# Patient Record
Sex: MALE | Race: WHITE | URBAN - METROPOLITAN AREA
[De-identification: names, ages, dates, MRNs, and addresses within clinical notes are randomized per-mention and may not be internally consistent; named-entity substitution may affect disease eponyms.]

---

## 2017-12-03 ENCOUNTER — INPATIENT (INPATIENT)
Facility: HOSPITAL | Age: 52
LOS: 2 days | Discharge: LEFT BEFORE DISCHARGE | DRG: 917 | End: 2017-12-06
Attending: INTERNAL MEDICINE | Admitting: INTERNAL MEDICINE
Payer: COMMERCIAL

## 2017-12-03 VITALS
DIASTOLIC BLOOD PRESSURE: 80 MMHG | OXYGEN SATURATION: 100 % | SYSTOLIC BLOOD PRESSURE: 145 MMHG | HEART RATE: 111 BPM | RESPIRATION RATE: 20 BRPM | TEMPERATURE: 100 F | WEIGHT: 178.57 LBS

## 2017-12-04 LAB
ALBUMIN SERPL ELPH-MCNC: 3.7 G/DL — SIGNIFICANT CHANGE UP (ref 3.3–5)
ALP SERPL-CCNC: 48 U/L — SIGNIFICANT CHANGE UP (ref 40–120)
ALT FLD-CCNC: 32 U/L — SIGNIFICANT CHANGE UP (ref 10–45)
ANION GAP SERPL CALC-SCNC: 13 MMOL/L — SIGNIFICANT CHANGE UP (ref 5–17)
ANION GAP SERPL CALC-SCNC: 13 MMOL/L — SIGNIFICANT CHANGE UP (ref 5–17)
AST SERPL-CCNC: 29 U/L — SIGNIFICANT CHANGE UP (ref 10–40)
BASE EXCESS BLDA CALC-SCNC: -0.3 MMOL/L — SIGNIFICANT CHANGE UP (ref -2–3)
BASOPHILS NFR BLD AUTO: 0.3 % — SIGNIFICANT CHANGE UP (ref 0–2)
BILIRUB SERPL-MCNC: 0.3 MG/DL — SIGNIFICANT CHANGE UP (ref 0.2–1.2)
BUN SERPL-MCNC: 14 MG/DL — SIGNIFICANT CHANGE UP (ref 7–23)
BUN SERPL-MCNC: 15 MG/DL — SIGNIFICANT CHANGE UP (ref 7–23)
CALCIUM SERPL-MCNC: 8.7 MG/DL — SIGNIFICANT CHANGE UP (ref 8.4–10.5)
CALCIUM SERPL-MCNC: 8.7 MG/DL — SIGNIFICANT CHANGE UP (ref 8.4–10.5)
CHLORIDE SERPL-SCNC: 104 MMOL/L — SIGNIFICANT CHANGE UP (ref 96–108)
CHLORIDE SERPL-SCNC: 106 MMOL/L — SIGNIFICANT CHANGE UP (ref 96–108)
CO2 SERPL-SCNC: 22 MMOL/L — SIGNIFICANT CHANGE UP (ref 22–31)
CO2 SERPL-SCNC: 22 MMOL/L — SIGNIFICANT CHANGE UP (ref 22–31)
CREAT SERPL-MCNC: 0.9 MG/DL — SIGNIFICANT CHANGE UP (ref 0.5–1.3)
CREAT SERPL-MCNC: 0.91 MG/DL — SIGNIFICANT CHANGE UP (ref 0.5–1.3)
EOSINOPHIL NFR BLD AUTO: 0.3 % — SIGNIFICANT CHANGE UP (ref 0–6)
GAS PNL BLDA: SIGNIFICANT CHANGE UP
GLUCOSE BLDC GLUCOMTR-MCNC: 119 MG/DL — HIGH (ref 70–99)
GLUCOSE BLDC GLUCOMTR-MCNC: 141 MG/DL — HIGH (ref 70–99)
GLUCOSE SERPL-MCNC: 108 MG/DL — HIGH (ref 70–99)
GLUCOSE SERPL-MCNC: 120 MG/DL — HIGH (ref 70–99)
HCO3 BLDA-SCNC: 24 MMOL/L — SIGNIFICANT CHANGE UP (ref 21–28)
HCOV NL63 RNA SPEC QL NAA+PROBE: DETECTED
HCT VFR BLD CALC: 37.8 % — LOW (ref 39–50)
HCT VFR BLD CALC: 38.6 % — LOW (ref 39–50)
HGB BLD-MCNC: 12.8 G/DL — LOW (ref 13–17)
HGB BLD-MCNC: 13.1 G/DL — SIGNIFICANT CHANGE UP (ref 13–17)
HIV 1+2 AB+HIV1 P24 AG SERPL QL IA: SIGNIFICANT CHANGE UP
LACTATE SERPL-SCNC: 2 MMOL/L — SIGNIFICANT CHANGE UP (ref 0.5–2)
LIDOCAIN IGE QN: 15 U/L — SIGNIFICANT CHANGE UP (ref 7–60)
LYMPHOCYTES # BLD AUTO: 5.7 % — LOW (ref 13–44)
MAGNESIUM SERPL-MCNC: 1.8 MG/DL — SIGNIFICANT CHANGE UP (ref 1.6–2.6)
MAGNESIUM SERPL-MCNC: 2.7 MG/DL — HIGH (ref 1.6–2.6)
MCHC RBC-ENTMCNC: 28.4 PG — SIGNIFICANT CHANGE UP (ref 27–34)
MCHC RBC-ENTMCNC: 28.6 PG — SIGNIFICANT CHANGE UP (ref 27–34)
MCHC RBC-ENTMCNC: 33.9 G/DL — SIGNIFICANT CHANGE UP (ref 32–36)
MCHC RBC-ENTMCNC: 33.9 G/DL — SIGNIFICANT CHANGE UP (ref 32–36)
MCV RBC AUTO: 84 FL — SIGNIFICANT CHANGE UP (ref 80–100)
MCV RBC AUTO: 84.3 FL — SIGNIFICANT CHANGE UP (ref 80–100)
MONOCYTES NFR BLD AUTO: 6.2 % — SIGNIFICANT CHANGE UP (ref 2–14)
NEUTROPHILS NFR BLD AUTO: 87.5 % — HIGH (ref 43–77)
PCO2 BLDA: 37 MMHG — SIGNIFICANT CHANGE UP (ref 35–48)
PH BLDA: 7.43 — SIGNIFICANT CHANGE UP (ref 7.35–7.45)
PHOSPHATE SERPL-MCNC: 2.7 MG/DL — SIGNIFICANT CHANGE UP (ref 2.5–4.5)
PLATELET # BLD AUTO: 238 K/UL — SIGNIFICANT CHANGE UP (ref 150–400)
PLATELET # BLD AUTO: 241 K/UL — SIGNIFICANT CHANGE UP (ref 150–400)
PO2 BLDA: 192 MMHG — HIGH (ref 83–108)
POTASSIUM SERPL-MCNC: 4.1 MMOL/L — SIGNIFICANT CHANGE UP (ref 3.5–5.3)
POTASSIUM SERPL-MCNC: 4.2 MMOL/L — SIGNIFICANT CHANGE UP (ref 3.5–5.3)
POTASSIUM SERPL-SCNC: 4.1 MMOL/L — SIGNIFICANT CHANGE UP (ref 3.5–5.3)
POTASSIUM SERPL-SCNC: 4.2 MMOL/L — SIGNIFICANT CHANGE UP (ref 3.5–5.3)
PROT SERPL-MCNC: 6.8 G/DL — SIGNIFICANT CHANGE UP (ref 6–8.3)
RAPID RVP RESULT: DETECTED
RBC # BLD: 4.5 M/UL — SIGNIFICANT CHANGE UP (ref 4.2–5.8)
RBC # BLD: 4.58 M/UL — SIGNIFICANT CHANGE UP (ref 4.2–5.8)
RBC # FLD: 13.4 % — SIGNIFICANT CHANGE UP (ref 10.3–16.9)
RBC # FLD: 13.6 % — SIGNIFICANT CHANGE UP (ref 10.3–16.9)
SAO2 % BLDA: 99 % — SIGNIFICANT CHANGE UP (ref 95–100)
SODIUM SERPL-SCNC: 139 MMOL/L — SIGNIFICANT CHANGE UP (ref 135–145)
SODIUM SERPL-SCNC: 141 MMOL/L — SIGNIFICANT CHANGE UP (ref 135–145)
T PALLIDUM AB TITR SER: NEGATIVE — SIGNIFICANT CHANGE UP
VIT B12 SERPL-MCNC: 296 PG/ML — SIGNIFICANT CHANGE UP (ref 243–894)
WBC # BLD: 15.9 K/UL — HIGH (ref 3.8–10.5)
WBC # BLD: 18.8 K/UL — HIGH (ref 3.8–10.5)
WBC # FLD AUTO: 15.9 K/UL — HIGH (ref 3.8–10.5)
WBC # FLD AUTO: 18.8 K/UL — HIGH (ref 3.8–10.5)

## 2017-12-04 PROCEDURE — 71010: CPT | Mod: 26,76

## 2017-12-04 PROCEDURE — 93306 TTE W/DOPPLER COMPLETE: CPT | Mod: 26

## 2017-12-04 PROCEDURE — 99223 1ST HOSP IP/OBS HIGH 75: CPT | Mod: GC

## 2017-12-04 RX ORDER — AMPICILLIN TRIHYDRATE 250 MG
2 CAPSULE ORAL EVERY 6 HOURS
Qty: 0 | Refills: 0 | Status: DISCONTINUED | OUTPATIENT
Start: 2017-12-04 | End: 2017-12-04

## 2017-12-04 RX ORDER — VANCOMYCIN HCL 1 G
VIAL (EA) INTRAVENOUS
Qty: 0 | Refills: 0 | Status: DISCONTINUED | OUTPATIENT
Start: 2017-12-04 | End: 2017-12-04

## 2017-12-04 RX ORDER — DEXTROSE 50 % IN WATER 50 %
25 SYRINGE (ML) INTRAVENOUS ONCE
Qty: 0 | Refills: 0 | Status: DISCONTINUED | OUTPATIENT
Start: 2017-12-04 | End: 2017-12-06

## 2017-12-04 RX ORDER — PANTOPRAZOLE SODIUM 20 MG/1
40 TABLET, DELAYED RELEASE ORAL DAILY
Qty: 0 | Refills: 0 | Status: DISCONTINUED | OUTPATIENT
Start: 2017-12-04 | End: 2017-12-05

## 2017-12-04 RX ORDER — CEFTRIAXONE 500 MG/1
2 INJECTION, POWDER, FOR SOLUTION INTRAMUSCULAR; INTRAVENOUS EVERY 12 HOURS
Qty: 0 | Refills: 0 | Status: DISCONTINUED | OUTPATIENT
Start: 2017-12-04 | End: 2017-12-04

## 2017-12-04 RX ORDER — MAGNESIUM SULFATE 500 MG/ML
2 VIAL (ML) INJECTION ONCE
Qty: 0 | Refills: 0 | Status: COMPLETED | OUTPATIENT
Start: 2017-12-04 | End: 2017-12-04

## 2017-12-04 RX ORDER — THIAMINE MONONITRATE (VIT B1) 100 MG
100 TABLET ORAL DAILY
Qty: 0 | Refills: 0 | Status: DISCONTINUED | OUTPATIENT
Start: 2017-12-04 | End: 2017-12-06

## 2017-12-04 RX ORDER — PROPOFOL 10 MG/ML
5 INJECTION, EMULSION INTRAVENOUS
Qty: 1000 | Refills: 0 | Status: DISCONTINUED | OUTPATIENT
Start: 2017-12-04 | End: 2017-12-05

## 2017-12-04 RX ORDER — INSULIN LISPRO 100/ML
VIAL (ML) SUBCUTANEOUS EVERY 6 HOURS
Qty: 0 | Refills: 0 | Status: DISCONTINUED | OUTPATIENT
Start: 2017-12-04 | End: 2017-12-06

## 2017-12-04 RX ORDER — VANCOMYCIN HCL 1 G
1250 VIAL (EA) INTRAVENOUS ONCE
Qty: 0 | Refills: 0 | Status: COMPLETED | OUTPATIENT
Start: 2017-12-04 | End: 2017-12-04

## 2017-12-04 RX ORDER — THIAMINE MONONITRATE (VIT B1) 100 MG
100 TABLET ORAL ONCE
Qty: 0 | Refills: 0 | Status: COMPLETED | OUTPATIENT
Start: 2017-12-04 | End: 2017-12-04

## 2017-12-04 RX ORDER — AMPICILLIN TRIHYDRATE 250 MG
2 CAPSULE ORAL EVERY 4 HOURS
Qty: 0 | Refills: 0 | Status: DISCONTINUED | OUTPATIENT
Start: 2017-12-04 | End: 2017-12-04

## 2017-12-04 RX ORDER — LEVETIRACETAM 250 MG/1
500 TABLET, FILM COATED ORAL EVERY 12 HOURS
Qty: 0 | Refills: 0 | Status: DISCONTINUED | OUTPATIENT
Start: 2017-12-04 | End: 2017-12-05

## 2017-12-04 RX ORDER — AMPICILLIN TRIHYDRATE 250 MG
2 CAPSULE ORAL ONCE
Qty: 0 | Refills: 0 | Status: COMPLETED | OUTPATIENT
Start: 2017-12-04 | End: 2017-12-04

## 2017-12-04 RX ORDER — DEXAMETHASONE 0.5 MG/5ML
12 ELIXIR ORAL EVERY 6 HOURS
Qty: 0 | Refills: 0 | Status: DISCONTINUED | OUTPATIENT
Start: 2017-12-04 | End: 2017-12-04

## 2017-12-04 RX ORDER — DEXMEDETOMIDINE HYDROCHLORIDE IN 0.9% SODIUM CHLORIDE 4 UG/ML
0.2 INJECTION INTRAVENOUS
Qty: 200 | Refills: 0 | Status: DISCONTINUED | OUTPATIENT
Start: 2017-12-04 | End: 2017-12-04

## 2017-12-04 RX ORDER — VANCOMYCIN HCL 1 G
1000 VIAL (EA) INTRAVENOUS EVERY 8 HOURS
Qty: 0 | Refills: 0 | Status: DISCONTINUED | OUTPATIENT
Start: 2017-12-04 | End: 2017-12-04

## 2017-12-04 RX ORDER — VANCOMYCIN HCL 1 G
1250 VIAL (EA) INTRAVENOUS ONCE
Qty: 0 | Refills: 0 | Status: DISCONTINUED | OUTPATIENT
Start: 2017-12-04 | End: 2017-12-04

## 2017-12-04 RX ORDER — DEXTROSE 50 % IN WATER 50 %
1 SYRINGE (ML) INTRAVENOUS ONCE
Qty: 0 | Refills: 0 | Status: DISCONTINUED | OUTPATIENT
Start: 2017-12-04 | End: 2017-12-06

## 2017-12-04 RX ORDER — FOLIC ACID 0.8 MG
1 TABLET ORAL DAILY
Qty: 0 | Refills: 0 | Status: DISCONTINUED | OUTPATIENT
Start: 2017-12-04 | End: 2017-12-06

## 2017-12-04 RX ORDER — DEXTROSE 50 % IN WATER 50 %
12.5 SYRINGE (ML) INTRAVENOUS ONCE
Qty: 0 | Refills: 0 | Status: DISCONTINUED | OUTPATIENT
Start: 2017-12-04 | End: 2017-12-06

## 2017-12-04 RX ORDER — CHLORHEXIDINE GLUCONATE 213 G/1000ML
15 SOLUTION TOPICAL
Qty: 0 | Refills: 0 | Status: DISCONTINUED | OUTPATIENT
Start: 2017-12-04 | End: 2017-12-05

## 2017-12-04 RX ORDER — AMPICILLIN TRIHYDRATE 250 MG
CAPSULE ORAL
Qty: 0 | Refills: 0 | Status: DISCONTINUED | OUTPATIENT
Start: 2017-12-04 | End: 2017-12-04

## 2017-12-04 RX ORDER — SODIUM CHLORIDE 9 MG/ML
1000 INJECTION INTRAMUSCULAR; INTRAVENOUS; SUBCUTANEOUS
Qty: 0 | Refills: 0 | Status: DISCONTINUED | OUTPATIENT
Start: 2017-12-04 | End: 2017-12-05

## 2017-12-04 RX ORDER — MAGNESIUM SULFATE 500 MG/ML
1 VIAL (ML) INJECTION ONCE
Qty: 0 | Refills: 0 | Status: DISCONTINUED | OUTPATIENT
Start: 2017-12-04 | End: 2017-12-04

## 2017-12-04 RX ORDER — HEPARIN SODIUM 5000 [USP'U]/ML
5000 INJECTION INTRAVENOUS; SUBCUTANEOUS EVERY 8 HOURS
Qty: 0 | Refills: 0 | Status: DISCONTINUED | OUTPATIENT
Start: 2017-12-04 | End: 2017-12-06

## 2017-12-04 RX ORDER — SODIUM CHLORIDE 9 MG/ML
1000 INJECTION, SOLUTION INTRAVENOUS
Qty: 0 | Refills: 0 | Status: DISCONTINUED | OUTPATIENT
Start: 2017-12-04 | End: 2017-12-06

## 2017-12-04 RX ORDER — PROPOFOL 10 MG/ML
5 INJECTION, EMULSION INTRAVENOUS
Qty: 1000 | Refills: 0 | Status: DISCONTINUED | OUTPATIENT
Start: 2017-12-04 | End: 2017-12-04

## 2017-12-04 RX ORDER — DEXAMETHASONE 0.5 MG/5ML
10 ELIXIR ORAL ONCE
Qty: 0 | Refills: 0 | Status: DISCONTINUED | OUTPATIENT
Start: 2017-12-04 | End: 2017-12-04

## 2017-12-04 RX ORDER — GLUCAGON INJECTION, SOLUTION 0.5 MG/.1ML
1 INJECTION, SOLUTION SUBCUTANEOUS ONCE
Qty: 0 | Refills: 0 | Status: DISCONTINUED | OUTPATIENT
Start: 2017-12-04 | End: 2017-12-06

## 2017-12-04 RX ADMIN — PANTOPRAZOLE SODIUM 40 MILLIGRAM(S): 20 TABLET, DELAYED RELEASE ORAL at 13:55

## 2017-12-04 RX ADMIN — Medication 216 GRAM(S): at 09:42

## 2017-12-04 RX ADMIN — PROPOFOL 2.43 MICROGRAM(S)/KG/MIN: 10 INJECTION, EMULSION INTRAVENOUS at 09:14

## 2017-12-04 RX ADMIN — Medication 106 MILLIGRAM(S): at 01:34

## 2017-12-04 RX ADMIN — HEPARIN SODIUM 5000 UNIT(S): 5000 INJECTION INTRAVENOUS; SUBCUTANEOUS at 21:36

## 2017-12-04 RX ADMIN — Medication 166.67 MILLIGRAM(S): at 11:49

## 2017-12-04 RX ADMIN — Medication 106 MILLIGRAM(S): at 11:31

## 2017-12-04 RX ADMIN — Medication 106 MILLIGRAM(S): at 17:28

## 2017-12-04 RX ADMIN — Medication 100 MILLIGRAM(S): at 01:34

## 2017-12-04 RX ADMIN — Medication 216 GRAM(S): at 17:28

## 2017-12-04 RX ADMIN — PANTOPRAZOLE SODIUM 40 MILLIGRAM(S): 20 TABLET, DELAYED RELEASE ORAL at 03:18

## 2017-12-04 RX ADMIN — LEVETIRACETAM 400 MILLIGRAM(S): 250 TABLET, FILM COATED ORAL at 17:28

## 2017-12-04 RX ADMIN — CHLORHEXIDINE GLUCONATE 15 MILLILITER(S): 213 SOLUTION TOPICAL at 06:48

## 2017-12-04 RX ADMIN — Medication 216 GRAM(S): at 01:34

## 2017-12-04 RX ADMIN — CEFTRIAXONE 100 GRAM(S): 500 INJECTION, POWDER, FOR SOLUTION INTRAMUSCULAR; INTRAVENOUS at 09:50

## 2017-12-04 RX ADMIN — PROPOFOL 2.43 MICROGRAM(S)/KG/MIN: 10 INJECTION, EMULSION INTRAVENOUS at 13:55

## 2017-12-04 RX ADMIN — LEVETIRACETAM 400 MILLIGRAM(S): 250 TABLET, FILM COATED ORAL at 06:48

## 2017-12-04 RX ADMIN — Medication 1 MILLIGRAM(S): at 16:52

## 2017-12-04 RX ADMIN — DEXMEDETOMIDINE HYDROCHLORIDE IN 0.9% SODIUM CHLORIDE 4.05 MICROGRAM(S)/KG/HR: 4 INJECTION INTRAVENOUS at 09:41

## 2017-12-04 RX ADMIN — CHLORHEXIDINE GLUCONATE 15 MILLILITER(S): 213 SOLUTION TOPICAL at 02:41

## 2017-12-04 RX ADMIN — Medication 100 MILLIGRAM(S): at 16:52

## 2017-12-04 RX ADMIN — Medication 106 MILLIGRAM(S): at 06:48

## 2017-12-04 RX ADMIN — Medication 216 GRAM(S): at 13:47

## 2017-12-04 RX ADMIN — PROPOFOL 2.43 MICROGRAM(S)/KG/MIN: 10 INJECTION, EMULSION INTRAVENOUS at 17:42

## 2017-12-04 RX ADMIN — Medication 166.67 MILLIGRAM(S): at 02:42

## 2017-12-04 RX ADMIN — HEPARIN SODIUM 5000 UNIT(S): 5000 INJECTION INTRAVENOUS; SUBCUTANEOUS at 06:48

## 2017-12-04 RX ADMIN — HEPARIN SODIUM 5000 UNIT(S): 5000 INJECTION INTRAVENOUS; SUBCUTANEOUS at 13:47

## 2017-12-04 RX ADMIN — CHLORHEXIDINE GLUCONATE 15 MILLILITER(S): 213 SOLUTION TOPICAL at 17:28

## 2017-12-04 RX ADMIN — Medication 50 GRAM(S): at 01:34

## 2017-12-04 RX ADMIN — PROPOFOL 2.43 MICROGRAM(S)/KG/MIN: 10 INJECTION, EMULSION INTRAVENOUS at 11:31

## 2017-12-04 NOTE — H&P ADULT - NSHPLABSRESULTS_GEN_ALL_CORE
12-04    139  |  104  |  15  ----------------------------<  108<H>  4.1   |  22  |  0.91    Ca    8.7      04 Dec 2017 00:54  Phos  2.7     12-04  Mg     1.8     12-04    TPro  6.8  /  Alb  3.7  /  TBili  0.3  /  DBili  x   /  AST  29  /  ALT  32  /  AlkPhos  48  12-04                        12.8   15.9  )-----------( 241      ( 04 Dec 2017 00:54 )             37.8

## 2017-12-04 NOTE — H&P ADULT - ASSESSMENT
Patient is a 52 year old M transferred from [facility] in [place] with respiratory failure secondary and AMS of unclear etiology     NEURO:  -Sedation with propofol    #AMS  -Unclear etiology.  Possibly secondary to overdose vs seizure, r/o meningitis  -Patient initially obtunded, however now is slightly agitated.  Patient is sedated on a vent but opens eyes spontaneously and follows commands.  Moves all extremities spontaneously.  Strength 4+/5 throughout.  Eyes are midline and patient will not track, however he does not appear to have gaze deviance.  Pupils 2 mm and reactive bilaterally.  Negative babinsky bilaterally.  -Utox (+) for cocaine, marijuana, opiates, EtOH.  S/p narcan by EMS  -Head CT and CTA show no acute infarcts or ICH, however questionable transverse sinus thrombosis.  F/u neuro reccs  -Follow up LP results to rule out meningitis.  Low suspicion given history; no leukocytosis or brudzinsky/kernig, however did have rectal temp of 103 prior to transfer.  C/w rocephin, vanc, and unasyn for now.    CARDIAC:  #sinus tachycardia  -Presented to [facility] with HR 110s-120s.  -EKG:  -Trop negative x 1 at [place]    RESP:  On ventilator.  ABG at [place] showed normal pH and pCO2.  f/u repeat ABG    GI:  NPO  Protonix 40 mg daily  No bowel regimen at this time    RENAL:  Creatinine WNL at [place].   Trend BMP  Monitor UOP.  (+) Shah    HEME:  -Hgb, WBC and plt WNL at [facility].  Trend CBC  -Hep SQ      ENDO:  -No known history of thyroidal dz or DM.    -Follow fingersticks    ID:  -CXR without infiltrates.  UA without evidence of UTI.  -f/u blood cx    -f/u LP to rule out meningitis.  c/w abx for now. f/u neuro reccs.      F:  NS @   E:  Replete PRN to keep Mg>2, K>4  N:  NPO for now  Ppx: hep SQ  Code status:  full code  Dispo:  MICU Patient is a 52 year old M transferred from [facility] in [place] with respiratory failure secondary and AMS of unclear etiology     NEURO:  -Sedation with propofol    #meningitis  -Likely bacterial meningitis.  Fevers, AMS, LP with >1,000 WBC (neutrophil predominance), lactate 2.  -Preliminary LP results from [place] shows 1,000 WBC, 1,600 RBC.  Neutrophil predominance (60%).  Viral panel pending.    -f/u RVP  -c/w Rocephin, Vancomycin, Ampicillin.   -s/p Decadron 1.5 mg/kg.  C/w decadron Q6H  -f/u blood cx    #AMS  -Unclear etiology.  Likely secondary to bacterial meningitis.  Possibly secondary to overdose vs seizure  -Patient obtunded when EMS arrived.  Presented to [facility] agitated on a propofol gtt.  Opened eyes spontaneously and followed commands.  Moved all extremities spontaneously.  Strength 4+/5 throughout.  Eyes were midline and patient would not track, however he does not appear to have gaze deviance to the R.  Pupils 2 mm and reactive bilaterally.  Negative babinsky bilaterally.  -Utox (+) for cocaine, marijuana, opiates, EtOH.  S/p narcan by EMS  -Head CT and CTA show no abnormalities (per [facility] radiology attending)  -Follow up LP results.  Trx as above for meningitis   -f/u need for vEEG    ID:  -CXR without infiltrates.  UA without evidence of UTI.    #severe sepsis secondary to meningitis  -treat as above    CARDIAC:  #sinus tachycardia  -Presented to [facility] with HR 110s-120s.  -EKG:  sinus tachycardia  -Trop negative x 1 at [place]    RESP:  On ventilator.  ABG showed elevated pO2.  Titrated down FiO2.    CXR unremarkable  f/u RVP    GI:  NPO  Protonix 40 mg daily  No bowel regimen at this time    RENAL:  Creatinine WNL.   Trend BMP  Monitor UOP.  (+) Shah    HEME:  -Hgb and plt WNLTrend CBC  -WBC 15K  -Hep SQ    ENDO:  -No known history of thyroidal dz or DM.    -Follow fingersticks    F:  NS @   E:  Replete PRN to keep Mg>2, K>4  N:  NPO for now  Ppx: hep SQ  Code status:  full code  Dispo:  MICU Patient is a 52 year old M transferred from [facility] in [place] with respiratory failure and severe sepsis likely due to bacterial meningitis.    NEURO:  -Sedation with propofol    #meningitis  -Likely bacterial meningitis.  Fevers, AMS, LP with >1,000 WBC (neutrophil predominance), lactate 2.  -Preliminary LP results from [place] shows 1,000 WBC, 1,600 RBC.  Neutrophil predominance (60%).  Viral panel pending.    -f/u RVP  -c/w Rocephin, Vancomycin, Ampicillin.  Hold off on acyclovir.  -Decadron 1.5 mg/kg Q6H.    -f/u blood cx    #AMS  -Likely secondary to bacterial meningitis.  Possibly secondary to overdose vs seizure  -Patient obtunded when EMS arrived.  Presented to [facility] agitated on a propofol gtt.  Opened eyes spontaneously and followed commands.  Moved all extremities spontaneously.  Strength 4+/5 throughout.  Eyes were midline and patient would not track, however he does not appear to have gaze deviance to the R.  Pupils 2 mm and reactive bilaterally.  Negative babinsky bilaterally.  -Utox (+) for cocaine, marijuana, opiates, EtOH.  S/p narcan by EMS  -Head CT and CTA show no abnormalities (per [facility] radiology attending)  -Follow up LP results.  Trx as above for meningitis   -f/u need for vEEG  -consider serotoning syndrome in the setting of polysubstance abuse.  Obtain collateral on outpatient med list.      ID:  -CXR without infiltrates.  UA without evidence of UTI.    #severe sepsis secondary to meningitis  -treat as above.  follow up blood cx, RVP, HIV    CARDIAC:  #sinus tachycardia  -Presented to [facility] with HR 110s-120s.  -EKG:  sinus tachycardia  -Trop negative x 1 at [place]    RESP:  On ventilator.  ABG showed elevated pO2.  Titrated down FiO2.    CXR unremarkable  f/u RVP    GI:  NPO  Protonix 40 mg daily  No bowel regimen at this time    RENAL:  Creatinine WNL.   Trend BMP  Monitor UOP.  (+) Shah    HEME:  -Hgb and plt WNLTrend CBC  -WBC 15K  -Hep SQ    ENDO:  -No known history of thyroidal dz or DM.    -Follow fingersticks  -f/u TSH    F:  NS @   E:  Replete PRN to keep Mg>2, K>4  N:  NPO for now  Ppx: hep SQ  Code status:  full code  [facility] 52 [facility] from [facility] in [place] with respiratory failure and severe sepsis likely due to bacterial meningitis.    NEURO:  -Sedation with propofol    #meningitis  -Likely bacterial meningitis.  Fevers, AMS, LP with >1,000 WBC (neutrophil predominance), lactate 2.  -Preliminary LP results from [place] shows 1,000 WBC, 1,600 RBC.  Neutrophil predominance (60%).  Viral panel pending.    -f/u RVP  -c/w Rocephin, Vancomycin, Ampicillin.  Hold off on acyclovir.  -Decadron 1.5 mg/kg Q6H.    -f/u blood cx    #AMS  -Likely toxic metabolic encephalopathy secondary to bacterial meningitis.  Possibly secondary to overdose vs seizure  -Patient obtunded when EMS arrived.  Presented to [facility] agitated on a propofol gtt.  Opened eyes spontaneously and followed commands.  Moved all extremities spontaneously.  Strength 4+/5 throughout.  Eyes were midline and patient would not track, however he does not appear to have gaze deviance to the R.  Pupils 2 mm and reactive bilaterally.  Negative babinsky bilaterally.  -Utox (+) for cocaine, marijuana, opiates, EtOH.  S/p narcan by EMS  -Head CT and CTA show no abnormalities (per [facility] radiology attending)  -Follow up LP results.  Trx as above for meningitis   -f/u need for vEEG and continuation of keppra.    -consider serotoning syndrome in the setting of polysubstance abuse.  Obtain collateral on outpatient med list.    -f/u B12, thiamine, RPR, TSH    ID:  -CXR without infiltrates.  UA without evidence of UTI.    #severe sepsis secondary to meningitis  -treat as above.  follow up blood cx, RVP, HIV    CARDIAC:  #sinus tachycardia  -Resolving.  Presented to [facility] with HR 110s-120s.  Secondary to severe sepsis, possibly cocaine.  Avoid beta blockers in the setting of cocaine use.   -EKG:  sinus tachycardia  -Trop negative x 1 at [place]    RESP:  On ventilator.  ABG showed elevated pO2.  Titrated down FiO2.    CXR unremarkable  f/u RVP    GI:  NPO  Protonix 40 mg daily  No bowel regimen at this time  (+) NGT    RENAL:  Creatinine WNL.   Trend BMP  Monitor UOP.  (+) Shah    HEME:  -Hgb and plt WNLTrend CBC  -WBC 15K  -Hep SQ    ENDO:  -No known history of thyroidal dz or DM.    -Follow fingersticks  -f/u TSH    PSYCH:  #polysubstance abuse  -Utox positive for cocaine, marijuana, opiates.  Monitor for signs of withdrawal.  Once extubated, monitor CIWA     F:  NS @ 100/hour  E:  Replete PRN to keep Mg>2, K>4  N:  NPO for now  Ppx: hep SQ  Code status:  full code  Dispo:  MICU Patient is a 52 year old M transferred from [facility] in [place] with respiratory failure and severe sepsis likely due to bacterial meningitis.    NEURO:  -Sedation with propofol    #meningitis  -Likely bacterial meningitis.  Fevers, AMS, LP with >1,000 WBC (neutrophil predominance), lactate 2.  -Preliminary LP results from [place] shows 1,000 WBC, 1,600 RBC.  Neutrophil predominance (60%).  Viral panel pending.    -c/w Rocephin, Vancomycin, Ampicillin.  Hold off on acyclovir.  -Decadron 1.5 mg/kg Q6H.    -f/u blood cx, RVP    #AMS  -Likely toxic metabolic encephalopathy secondary to bacterial meningitis.  Possibly secondary to overdose vs seizure  -Patient obtunded when EMS arrived.  Presented to [facility] agitated on a propofol gtt.  Opened eyes spontaneously and followed commands.  Moved all extremities spontaneously.  Strength 4+/5 throughout.  Eyes were midline and patient would not track, however he does not appear to have gaze deviance to the R.  Pupils 2 mm and reactive bilaterally.  Negative babinsky bilaterally.  -Utox (+) for cocaine, marijuana, opiates, EtOH.  S/p narcan by EMS  -Head CT and CTA show no abnormalities (per [facility] radiology attending)  -Follow up LP results.  Trx as above for meningitis   -f/u need for vEEG and continuation of keppra.    -consider serotonin syndrome in the setting of polysubstance abuse.  Obtain collateral on outpatient med list.    -f/u B12, thiamine, RPR, TSH    ID:  -CXR without infiltrates.  UA without evidence of UTI.    #severe sepsis secondary to meningitis  -treat as above.  follow up blood cx, RVP, HIV, repeat lactate    CARDIAC:  #sinus tachycardia  -Resolving.  Presented to [facility] with HR 110s-120s.  Secondary to severe sepsis, possibly cocaine.  Avoid beta blockers in the setting of cocaine use.   -EKG:  sinus tachycardia  -Trop negative x 1 at [place]    RESP:  On ventilator.  ABG showed elevated pO2.  Titrated down FiO2.    CXR unremarkable  f/u RVP    GI:  NPO  Protonix 40 mg daily  No bowel regimen at this time  (+) NGT    RENAL:  Creatinine WNL.   Trend BMP  Monitor UOP.  (+) Shah    HEME:  -Hgb and plt WNLTrend CBC  -WBC 15K  -Hep SQ    ENDO:  -No known history of thyroidal dz or DM.    -Follow fingersticks  -f/u TSH    PSYCH:  #polysubstance abuse  -Utox positive for cocaine, marijuana, opiates.  Monitor for signs of withdrawal.  Once extubated, monitor CIWA     F:  NS @ 100/hour  E:  Replete PRN to keep Mg>2, K>4  N:  NPO for now  Ppx: hep SQ  Code status:  full code  Dispo:  MICU Patient is a 52 year old M transferred from [facility] in [place] with respiratory failure and severe sepsis likely due to bacterial meningitis.    NEURO:  -Sedation with propofol    #meningitis  -Likely bacterial meningitis.  Fevers, AMS, LP with >1,000 WBC (neutrophil predominance), lactate 2.  -Preliminary LP results from [place] shows 1,000 WBC, 1,600 RBC.  Neutrophil predominance (60%).  Viral panel pending.    -c/w Rocephin, Vancomycin, Ampicillin.  Hold off on acyclovir.  -Decadron 1.5 mg/kg Q6H.    -f/u blood cx, RVP, final LP results    #AMS  -Likely toxic metabolic encephalopathy secondary to bacterial meningitis.  Possibly secondary to overdose vs seizure vs encephalitis  -Patient obtunded when EMS arrived.  Presented to [facility] agitated on a propofol gtt.  Opened eyes spontaneously and followed commands.  Moved all extremities spontaneously.  Strength 4+/5 throughout.  Eyes were midline and patient would not track, however he does not appear to have gaze deviance to the R.  Pupils 2 mm and reactive bilaterally.  Negative babinsky bilaterally.  -Utox (+) for cocaine, marijuana, opiates, EtOH.  S/p narcan by EMS  -Head CT and CTA show no abnormalities (per [facility] radiology attending)  -Follow up LP results.  Trx as above for meningitis   -f/u need for vEEG and continuation of keppra.    -consider serotonin syndrome in the setting of polysubstance abuse.  Obtain collateral on outpatient med list.    -f/u B12, thiamine, RPR, TSH, HSV    ID:  -CXR without infiltrates.  UA without evidence of UTI.    #severe sepsis secondary to meningitis  -treat as above.  follow up blood cx, RVP, HIV, repeat lactate    CARDIAC:  #sinus tachycardia  -Resolving.  Presented to [facility] with HR 110s-120s.  Secondary to severe sepsis, possibly cocaine.  Avoid beta blockers in the setting of cocaine use.   -EKG:  sinus tachycardia  -Trop negative x 1 at [place]    RESP:  On ventilator.  ABG showed elevated pO2.  Titrated down FiO2.    CXR unremarkable  f/u RVP    GI:  NPO  Protonix 40 mg daily  No bowel regimen at this time  (+) NGT    RENAL:  Creatinine WNL.   Trend BMP  Monitor UOP.  (+) Shah    HEME:  -Hgb and plt WNLTrend CBC  -WBC 15K  -Hep SQ    ENDO:  -No known history of thyroidal dz or DM.    -Follow fingersticks  -f/u TSH    PSYCH:  #polysubstance abuse  -Utox positive for cocaine, marijuana, opiates.  Monitor for signs of withdrawal.  Once extubated, monitor CIWA     F:  NS @ 100/hour  E:  Replete PRN to keep Mg>2, K>4  N:  NPO for now  Ppx: SCDs, protonix  Code status:  full code  Dispo:  MICU Patient is a 52 year old M transferred from [facility] in [place] with AMS, respiratory failure and severe sepsis likely due to bacterial meningitis with possible polysubstance overdose and seizure.    NEURO:  -Sedation with propofol    #meningitis  -Likely bacterial meningitis.  Fevers, AMS, LP with >1,000 WBC (neutrophil predominance), lactate 2.  -Preliminary LP results from [place] shows 1,000 WBC, 1,600 RBC.  Neutrophil predominance (60%).  Viral panel pending.    -c/w Rocephin, Vancomycin, Ampicillin.  Hold off on acyclovir.  -Decadron 1.5 mg/kg Q6H.    -f/u blood cx, RVP, final LP results    #AMS  -Likely toxic metabolic encephalopathy secondary to bacterial meningitis.  Possibly secondary to overdose vs seizure vs encephalitis  -Patient obtunded when EMS arrived.  Presented to [facility] agitated on a propofol gtt.  Opened eyes spontaneously and followed commands.  Moved all extremities spontaneously.  Strength 4+/5 throughout.  Eyes were midline and patient would not track, however he does not appear to have gaze deviance to the R.  Pupils 2 mm and reactive bilaterally.  Negative babinsky bilaterally.  -Utox (+) for cocaine, marijuana, opiates, EtOH.  S/p narcan by EMS  -Head CT and CTA show no abnormalities (per [facility] radiology attending)  -Follow up LP results.  Trx as above for meningitis   -f/u need for vEEG and continuation of keppra.    -consider serotonin syndrome in the setting of polysubstance abuse.  Obtain collateral on outpatient med list.    -f/u B12, thiamine, RPR, TSH, HSV    #possible seizure  -Noted to have decorticate posturing and incontinence by EMS.  Possible sz secondary to meningitis vs polysubstance abuse  -No tongue lac.    -S/p 1 g Keppra  -c/w propofol gtt  -f/u need for vEEG and continuation of antiepileptics    ID:  -CXR without infiltrates.  UA without evidence of UTI.    #severe sepsis secondary to meningitis  -treat as above.  follow up blood cx, RVP, HIV, repeat lactate    CARDIAC:  #sinus tachycardia  -Resolving.  Presented to [facility] with HR 110s-120s.  Secondary to severe sepsis, possibly cocaine.  Avoid beta blockers in the setting of cocaine use.   -EKG:  sinus tachycardia  -Trop negative x 1 at [place]    RESP:  #respiratory failure  -Secondary to AMS.  Intubated for airway protection.  -On ventilator.  -ABG showed elevated pO2.  Titrated down FiO2.    -CXR unremarkable  -f/u RVP    GI:  NPO  Protonix 40 mg daily  No bowel regimen at this time  (+) NGT    RENAL:  Creatinine WNL.   Trend BMP  Monitor UOP.  (+) Shah    HEME:  -Hgb and plt WNLTrend CBC  -WBC 15K  -Hep SQ    ENDO:  -No known history of thyroidal dz or DM.    -Follow fingersticks  -f/u TSH    PSYCH:  #polysubstance abuse  -Utox positive for cocaine, marijuana, opiates.  Monitor for signs of withdrawal.  Once extubated, monitor CIWA     F:  NS @ 100/hour  E:  Replete PRN to keep Mg>2, K>4  N:  NPO for now  Ppx: SCDs, protonix  Code status:  full code  Dispo:  MICU Patient is a 52 year old M transferred from [facility] in [place] with AMS, respiratory failure and severe sepsis likely due to bacterial meningitis with possible polysubstance overdose and seizure.    NEURO:  -Sedation with propofol    #meningitis  -Likely bacterial meningitis.  Fevers, AMS, LP with >1,000 WBC (neutrophil predominance), lactate 2.  -Preliminary LP results from [place] shows 1,000 WBC, 1,600 RBC.  Neutrophil predominance (60%).  Viral panel pending.    -c/w Rocephin, Vancomycin, Ampicillin.  Hold off on acyclovir.  -Decadron 1.5 mg/kg Q6H.    -f/u blood cx, RVP, final LP results    #AMS  -Likely toxic metabolic encephalopathy secondary to bacterial meningitis.  Possibly secondary to overdose vs seizure vs encephalitis  -Patient obtunded when EMS arrived.  Presented to [facility] agitated on a propofol gtt.  Opened eyes spontaneously and followed commands.  Moved all extremities spontaneously.  Strength 4+/5 throughout.  Eyes were midline and patient would not track, however he does not appear to have gaze deviance to the R.  Pupils 2 mm and reactive bilaterally.  Negative babinsky bilaterally.  -Utox (+) for cocaine, marijuana, opiates, EtOH.  S/p narcan by EMS  -Head CT and CTA show no abnormalities (per [facility] radiology attending)  -Follow up LP results.  Trx as above for meningitis   -f/u need for vEEG. C/w Keppra 500 mg IV Q12H.    -consider serotonin syndrome in the setting of polysubstance abuse.  Obtain collateral on outpatient med list.    -f/u B12, thiamine, RPR, TSH, HSV    #possible seizure  -Noted to have decorticate posturing and incontinence by EMS.  Possible sz secondary to meningitis vs polysubstance abuse  -No tongue lac.    -c/w Keppra  -c/w propofol gtt  -f/u need for vEEG    ID:  -CXR with possible mild bibasilar infiltrates (air bronchograms visible, more pronounced in [place]).  UA without evidence of UTI.    #severe sepsis secondary to meningitis  -treat as above.  follow up blood cx, RVP, HIV, repeat lactate    CARDIAC:  #sinus tachycardia  -Resolving.  Presented to [facility] with HR 110s-120s.  Secondary to severe sepsis, possibly cocaine.  Avoid beta blockers in the setting of cocaine use.   -EKG:  sinus tachycardia  -Trop negative x 1 at [place]    RESP:  #respiratory failure  -Secondary to AMS.  Intubated for airway protection.  -On ventilator.  -ABG showed elevated pO2.  Titrated down FiO2.    -CXR with possible mild bibasilar infiltrates (more pronounced in [place])  -f/u RVP    GI:  NPO  Protonix 40 mg daily  No bowel regimen at this time  (+) OGT    RENAL:  Creatinine WNL.   Trend BMP  Monitor UOP.  (+) Shah    HEME:  -Hgb and plt WNL. Trend CBC  -WBC 15K  -Hep SQ    ENDO:  -No known history of thyroidal dz or DM.    -Follow fingersticks  -f/u TSH    PSYCH:  #polysubstance abuse  -Utox positive for cocaine, marijuana, opiates.  Monitor for signs of withdrawal.  Once extubated, monitor CIWA     F:  NS @ 100/hour  E:  Replete PRN to keep Mg>2, K>4  N:  NPO for now  Ppx: SCDs, protonix  Code status:  full code  Dispo:  MICU

## 2017-12-04 NOTE — H&P ADULT - NSHPPHYSICALEXAM_GEN_ALL_CORE
General:  agitated, on a ventilator  HENT:  pupils 2 mm bilaterally, reactive to light.  could not assess EOM as he would not track.  Conjunctiva clear.  MMM   Neck:  Trachea midline.  No JVD, LAD, or thyromegaly.  Heart:  S1S2 no MRG  Lungs:  CTAB no wheezing  Abdomen:  NABS.  soft, nontender, nondistended.  no guarding.  no ascites.  no organomegaly.  Vascular:  + peripheral pulses  Neuro:  agitated, not able to answer yes/no questions.  Follows commands.  Strength 4/5 throughout.  Negative babinsky.  Negative brudzinsky and kernig.    Skin:  No rash General:  agitated, on a ventilator  HENT:  pupils 2 mm bilaterally, reactive to light.  could not assess EOM as he would not track.  Conjunctiva clear.  MMM   Neck:  Trachea midline.  No JVD, LAD, or thyromegaly.  Heart:  S1S2 no MRG  Lungs:  CTAB no wheezing  Abdomen:  NABS.  soft, nontender, nondistended.  no guarding.  no ascites.  no organomegaly.  Vascular:  + peripheral pulses  Neuro:  agitated, not able to answer yes/no questions.  Follows commands.  Strength 4/5 throughout.  Negative babinsky.  Negative brudzinsky and kernig.    Skin:  No rash.  No Janeway lesions or Osler nodes.  No splinter hemorrhages.

## 2017-12-04 NOTE — H&P ADULT - NSHPSOCIALHISTORY_GEN_ALL_CORE
tobacco- unknown  drugs- per girlfriend, patient does not use drugs, however he tested positive for cocaine, opiates and marijuana.  alcohol- drinks but unclear how heavily  Lives with- unknown  occupation- unknown

## 2017-12-04 NOTE — CHART NOTE - NSCHARTNOTEFT_GEN_A_CORE
PGY-3 Chart Note:    Spoke with [facility] ED RN Zhang Gray regarding results of recent CSF studies. Per RN, due to clerical error at [facility] CSF cell count WBC was read as 1000 H/UL, however upon re-examination cell count is 1 WBC H/UL.    Given this information, bacterial meningitis diagnosis can be excluded. Antibiotics will be discontinued at this time, airborne isolation will be discontinued. Underlying etiology of his condition may be secondary to illicit drug use, vs seizure disorder. Will continue vEEG at this time.    Reny Flores  PGY-3 PGY-3 Chart Note:    Spoke with [facility] ED RN Zhang Gray regarding results of recent CSF studies. Per RN, due to clerical error at [place] CSF cell count WBC was read as 1000 H/UL, however upon re-examination cell count is 1 WBC H/UL.    Given this information, bacterial meningitis diagnosis can be excluded. Antibiotics will be discontinued at this time, airborne isolation will be discontinued. Underlying etiology of his condition may be secondary to illicit drug use, vs seizure disorder. Will continue vEEG at this time.    Confirmed re-resulted information of CSF studies is located in paper chart.    Reny Flores  PGY-3

## 2017-12-04 NOTE — H&P ADULT - HISTORY OF PRESENT ILLNESS
Pt is a 52 year old M transferred from [facility] in [place] where he was admitted for AMS.  He was found to be unresponsive on the floor of a deli shop.  History obtained from patient's girlfriend.  He had agonal respirations but strong pulses and AED advised no shock.  He was given a nonrebreather and was satting well.  EMS gave Narcan 2 mg intranasally as his pupils were noted to be pinpoint.  He was noted to have a decorticate posture and received 5 mg Versed.  In the ED, he was initially afebrile with , /110, RR 18, 98% on nonrebreather.    He was given 8 mg Ativan followed by Ketamine 50 mg x 2.  WBC 5k (61% neutrophils), hgb 14.6, plt 315k, BMP bicarb 19, AG 25, creatinine 1.1, glucose 104, trop <0.01, utox positive for cocaine, opiates, and marijuana, EtOH level 76 mg/dL.  UA without ketonuria, nitrites or LE.  Lactate 2.  CPK WNL. CT head showed acute infarct or ICH but could not exclude R transverse sinus thrombosis. Pt is a 52 year old M transferred from [facility] in [place] where he was admitted for AMS.  He was found to be unresponsive on the floor of a deli shop.  Incontinence noted.  Prior to this, he had drank a few alcoholic beverages per a friend.  No head trauma.  He had agonal respirations but strong pulses and AED advised no shock.  He was given a nonrebreather and was satting well.  EMS gave Narcan 2 mg intranasally as his pupils were noted to be pinpoint.  He was noted to have a decorticate posture and received 5 mg Versed.  In the ED, he was initially afebrile with , /110, RR 18, 98% on nonrebreather.    He was given 8 mg Ativan followed by Ketamine 50 mg x 2.  He was intubated and started on propofol gtt.  ABG pH 7.33, pCO2 41, pO2 107.  WBC 5k (61% neutrophils), hgb 14.6, plt 315k, BMP bicarb 19, AG 25, creatinine 1.1, glucose 104, trop <0.01, utox positive for cocaine, opiates, and marijuana, EtOH level 76 mg/dL.  UA without ketonuria, nitrites or LE.  Lactate 2.  CPK WNL. CXR without infiltrates.  LP was performed.  CT head showed acute infarct or ICH but could not exclude R transverse sinus thrombosis.  Prior to transfer he spiked to 103.  He received 2 grams Rocephin, Keppra 1g and was transferred to [facility].       Upon arrival to [facility], T 100, , 145/80, RR 20, O2 100%.   He was undersedated and agitated, pulling at his ET tube. Pt is a 52 year old M transferred from [facility] in [place] where he was admitted for AMS.  PMHx unknown.  He was found to be unresponsive today on the floor of a deli shop.  Incontinence noted but no tonic/clonic movements.  Prior to this, he had drank a few alcoholic beverages per a friend.  No head trauma.  He had agonal respirations but strong pulses and AED advised no shock.  He was given a nonrebreather and was satting well.  EMS gave Narcan 2 mg intranasally as his pupils were noted to be pinpoint.  Per EMS, this improved his breathing but he remained unresponsive.  He was noted to have a decorticate posture and received 5 mg Versed.  In the ED, he was initially afebrile with , /110, RR 18, 98% on nonrebreather.    He was given 8 mg Ativan (2 mg x 4) followed by Ketamine 50 mg x 2.  He was intubated and started on a propofol gtt.  ABG pH 7.33, pCO2 41, pO2 107.  WBC 5k (61% neutrophils), hgb 14.6, plt 315k, BMP bicarb 19, AG 25, creatinine 1.1, glucose 104, trop <0.01, utox positive for cocaine, opiates, and marijuana, EtOH level 76 mg/dL.  UA without ketonuria, nitrites or LE.  Lactate 2.  CPK WNL. CXR without infiltrates.  LP was performed.  CT head showed acute infarct or ICH but could not exclude R transverse sinus thrombosis.  Prior to transfer he spiked to 103.  He received 2 grams Rocephin, Keppra 1g and was transferred to [facility].       Upon arrival to [facility], T 100, , 145/80, RR 20, O2 100%.   He was undersedated and agitated, pulling at his ET tube.  Unable to answer questions but did follow some commands.

## 2017-12-05 LAB
ANION GAP SERPL CALC-SCNC: 15 MMOL/L — SIGNIFICANT CHANGE UP (ref 5–17)
BUN SERPL-MCNC: 18 MG/DL — SIGNIFICANT CHANGE UP (ref 7–23)
CALCIUM SERPL-MCNC: 9.1 MG/DL — SIGNIFICANT CHANGE UP (ref 8.4–10.5)
CHLORIDE SERPL-SCNC: 103 MMOL/L — SIGNIFICANT CHANGE UP (ref 96–108)
CO2 SERPL-SCNC: 22 MMOL/L — SIGNIFICANT CHANGE UP (ref 22–31)
CREAT SERPL-MCNC: 0.84 MG/DL — SIGNIFICANT CHANGE UP (ref 0.5–1.3)
GLUCOSE BLDC GLUCOMTR-MCNC: 120 MG/DL — HIGH (ref 70–99)
GLUCOSE BLDC GLUCOMTR-MCNC: 126 MG/DL — HIGH (ref 70–99)
GLUCOSE BLDC GLUCOMTR-MCNC: 136 MG/DL — HIGH (ref 70–99)
GLUCOSE BLDC GLUCOMTR-MCNC: 137 MG/DL — HIGH (ref 70–99)
GLUCOSE SERPL-MCNC: 139 MG/DL — HIGH (ref 70–99)
HCT VFR BLD CALC: 38.6 % — LOW (ref 39–50)
HGB BLD-MCNC: 12.7 G/DL — LOW (ref 13–17)
MAGNESIUM SERPL-MCNC: 2.3 MG/DL — SIGNIFICANT CHANGE UP (ref 1.6–2.6)
MCHC RBC-ENTMCNC: 28.2 PG — SIGNIFICANT CHANGE UP (ref 27–34)
MCHC RBC-ENTMCNC: 32.9 G/DL — SIGNIFICANT CHANGE UP (ref 32–36)
MCV RBC AUTO: 85.6 FL — SIGNIFICANT CHANGE UP (ref 80–100)
PLATELET # BLD AUTO: 242 K/UL — SIGNIFICANT CHANGE UP (ref 150–400)
POTASSIUM SERPL-MCNC: 4.5 MMOL/L — SIGNIFICANT CHANGE UP (ref 3.5–5.3)
POTASSIUM SERPL-SCNC: 4.5 MMOL/L — SIGNIFICANT CHANGE UP (ref 3.5–5.3)
RBC # BLD: 4.51 M/UL — SIGNIFICANT CHANGE UP (ref 4.2–5.8)
RBC # FLD: 13.5 % — SIGNIFICANT CHANGE UP (ref 10.3–16.9)
SODIUM SERPL-SCNC: 140 MMOL/L — SIGNIFICANT CHANGE UP (ref 135–145)
WBC # BLD: 17.3 K/UL — HIGH (ref 3.8–10.5)
WBC # FLD AUTO: 17.3 K/UL — HIGH (ref 3.8–10.5)

## 2017-12-05 PROCEDURE — 99233 SBSQ HOSP IP/OBS HIGH 50: CPT | Mod: GC

## 2017-12-05 PROCEDURE — 71010: CPT | Mod: 26

## 2017-12-05 RX ORDER — FENTANYL CITRATE 50 UG/ML
1 INJECTION INTRAVENOUS
Qty: 2500 | Refills: 0 | Status: DISCONTINUED | OUTPATIENT
Start: 2017-12-05 | End: 2017-12-05

## 2017-12-05 RX ORDER — QUETIAPINE FUMARATE 200 MG/1
50 TABLET, FILM COATED ORAL EVERY 12 HOURS
Qty: 0 | Refills: 0 | Status: DISCONTINUED | OUTPATIENT
Start: 2017-12-05 | End: 2017-12-05

## 2017-12-05 RX ORDER — DEXMEDETOMIDINE HYDROCHLORIDE IN 0.9% SODIUM CHLORIDE 4 UG/ML
0.7 INJECTION INTRAVENOUS
Qty: 200 | Refills: 0 | Status: DISCONTINUED | OUTPATIENT
Start: 2017-12-05 | End: 2017-12-05

## 2017-12-05 RX ORDER — DIAZEPAM 5 MG
5 TABLET ORAL EVERY 6 HOURS
Qty: 0 | Refills: 0 | Status: DISCONTINUED | OUTPATIENT
Start: 2017-12-05 | End: 2017-12-06

## 2017-12-05 RX ADMIN — CHLORHEXIDINE GLUCONATE 15 MILLILITER(S): 213 SOLUTION TOPICAL at 06:54

## 2017-12-05 RX ADMIN — DEXMEDETOMIDINE HYDROCHLORIDE IN 0.9% SODIUM CHLORIDE 14.18 MICROGRAM(S)/KG/HR: 4 INJECTION INTRAVENOUS at 13:48

## 2017-12-05 RX ADMIN — Medication 100 MILLIGRAM(S): at 13:47

## 2017-12-05 RX ADMIN — QUETIAPINE FUMARATE 50 MILLIGRAM(S): 200 TABLET, FILM COATED ORAL at 13:47

## 2017-12-05 RX ADMIN — FENTANYL CITRATE 8.1 MICROGRAM(S)/KG/HR: 50 INJECTION INTRAVENOUS at 00:24

## 2017-12-05 RX ADMIN — Medication 5 MILLIGRAM(S): at 23:08

## 2017-12-05 RX ADMIN — SODIUM CHLORIDE 100 MILLILITER(S): 9 INJECTION INTRAMUSCULAR; INTRAVENOUS; SUBCUTANEOUS at 13:48

## 2017-12-05 RX ADMIN — LEVETIRACETAM 400 MILLIGRAM(S): 250 TABLET, FILM COATED ORAL at 06:54

## 2017-12-05 RX ADMIN — PANTOPRAZOLE SODIUM 40 MILLIGRAM(S): 20 TABLET, DELAYED RELEASE ORAL at 13:46

## 2017-12-05 RX ADMIN — Medication 1 MILLIGRAM(S): at 13:47

## 2017-12-05 RX ADMIN — HEPARIN SODIUM 5000 UNIT(S): 5000 INJECTION INTRAVENOUS; SUBCUTANEOUS at 06:55

## 2017-12-05 RX ADMIN — HEPARIN SODIUM 5000 UNIT(S): 5000 INJECTION INTRAVENOUS; SUBCUTANEOUS at 16:22

## 2017-12-05 RX ADMIN — HEPARIN SODIUM 5000 UNIT(S): 5000 INJECTION INTRAVENOUS; SUBCUTANEOUS at 22:00

## 2017-12-05 NOTE — PROGRESS NOTE ADULT - SUBJECTIVE AND OBJECTIVE BOX
Hospital Course  Pt is a 52 year old man transferred from [facility] in [place] was admitted for AMS found unresponsive on floor of a deli shop.  Hx significant of illicit drug use as per family and alcohol use as per friend. EMS was called, patient had agonal respirations observed by EMT.  Patient was put on a nonrebreather.  EMS gave Narcan 2mg intranasally because of pinpoint pupils.  Patient remained unresponsive.  Noted to have a decorticate posture received 5mg Versed.  In the ED patient afebrile, tachycardic, with elevated blood pressure 164/110 satting well on nonrebreather.  Patient was given 8mg of Ativan followed by Ketamine.  He was intubated and started on propofol drip.  Blood gas was pH 7.33, pCO2 41, pO2 107.  Bmp bicarb 19, anion gap 25.  Utox positive for cocaine, opiates and marijuana, ETOH level 76mg/dl.  Lactate 2. CPK WNL. LP was performed read initially as 1000 WBC, CT head negative.  Prior to transfer he spiked to 103.  Patient received 2 grams of Rocephin, Keppra 1g and was transferred for EEG monitoring and for MRI [place] with no vented patient MRI capabilities). RVP came back positive for coronavirus.  Patient on arrival to [facility] was unable to extubated 2/2 agitation.  Placed back on propofol drip.  Patient started on thiamine and folic acid.  [facility] was called new read WBC actually 1 not 1000 gram stain negative.  Antibiotics and contact was d'cd.  Echo showed mild global hypokinesis EF 40 to 45 percent.  Patient extubated today 12/5 after weaning of propofol drip started precedex and seroquel 50mg BID and valium 10mg q 6h to assist with weaning.  Straight cathed x 2 for urinary retention.  Valium titrated down to 5  q 6 h and seroquel d'cd once extubated.    INTERVAL HPI/OVERNIGHT EVENTS:  Patient was seen and examined at bedside no acute events. Patient sedated cannot assess complaints.    VITAL SIGNS:  ICU Vital Signs Last 24 Hrs  T(C): 36.3 (05 Dec 2017 17:53), Max: 37.1 (05 Dec 2017 01:32)  T(F): 97.4 (05 Dec 2017 17:53), Max: 98.8 (05 Dec 2017 01:32)  HR: 58 (05 Dec 2017 18:00) (54 - 102)  BP: 117/75 (05 Dec 2017 18:00) (84/49 - 138/67)  BP(mean): 98 (05 Dec 2017 18:00) (63 - 118)  ABP: --  ABP(mean): --  RR: 12 (05 Dec 2017 18:00) (11 - 23)  SpO2: 96% (05 Dec 2017 18:00) (94% - 100%)      PHYSICAL EXAM:    Constitutional: WDWN, NAD  HEENT: PERRL, EOMI, sclera non-icteric, neck supple, trachea midline, no masses, no JVD, MMM, good dentition  Respiratory: CTA b/l, good air entry b/l, no wheezing, no rhonchi, no rales, without accessory muscle use and no intercostal retractions  Cardiovascular: RRR, normal S1S2, no M/R/G  Gastrointestinal: soft, NTND, no masses palpable, BS normal  Extremities: Warm, well perfused, pulses equal bilateral upper and lower extremities, no edema, no clubbing  Neurological: AAOx3, CN Grossly intact  Skin: Normal temperature, warm, dry    MEDICATIONS  (STANDING):  dextrose 5%. 1000 milliLiter(s) (50 mL/Hr) IV Continuous <Continuous>  dextrose 50% Injectable 12.5 Gram(s) IV Push once  dextrose 50% Injectable 25 Gram(s) IV Push once  dextrose 50% Injectable 25 Gram(s) IV Push once  diazepam    Tablet 5 milliGRAM(s) Oral every 6 hours  folic acid 1 milliGRAM(s) Enteral Tube daily  heparin  Injectable 5000 Unit(s) SubCutaneous every 8 hours  insulin lispro (HumaLOG) corrective regimen sliding scale   SubCutaneous every 6 hours  thiamine 100 milliGRAM(s) Enteral Tube daily    MEDICATIONS  (PRN):  dextrose Gel 1 Dose(s) Oral once PRN Blood Glucose LESS THAN 70 milliGRAM(s)/deciliter  glucagon  Injectable 1 milliGRAM(s) IntraMuscular once PRN Glucose LESS THAN 70 milligrams/deciliter      Allergies    No Known Allergies    Intolerances        LABS:                        12.7   17.3  )-----------( 242      ( 05 Dec 2017 06:55 )             38.6     12-05    140  |  103  |  18  ----------------------------<  139<H>  4.5   |  22  |  0.84    Ca    9.1      05 Dec 2017 06:55  Phos  2.7     12-04  Mg     2.3     12-05    TPro  6.8  /  Alb  3.7  /  TBili  0.3  /  DBili  x   /  AST  29  /  ALT  32  /  AlkPhos  48  12-04          RADIOLOGY & ADDITIONAL TESTS:  Reviewed Hospital Course  Pt is a 52 year old man transferred from [facility] in [place] was admitted for AMS found unresponsive on floor of a deli shop.  Hx significant of illicit drug use as per family and alcohol use as per friend. EMS was called, patient had agonal respirations observed by EMT.  Patient was put on a nonrebreather.  EMS gave Narcan 2mg intranasally because of pinpoint pupils.  Patient remained unresponsive.  Noted to have a decorticate posture received 5mg Versed.  In the ED patient afebrile, tachycardic, with elevated blood pressure 164/110 satting well on nonrebreather.  Patient was given 8mg of Ativan followed by Ketamine.  He was intubated and started on propofol drip.  Blood gas was pH 7.33, pCO2 41, pO2 107.  Bmp bicarb 19, anion gap 25.  Utox positive for cocaine, opiates and marijuana, ETOH level 76mg/dl.  Lactate 2. CPK WNL. LP was performed read initially as 1000 WBC, CT head negative.  Prior to transfer he spiked to 103.  Patient received 2 grams of Rocephin, Keppra 1g and was transferred for EEG monitoring and for MRI [place] with no vented patient MRI capabilities). RVP came back positive for coronavirus.  Patient on arrival to [facility] was unable to extubated 2/2 agitation.  Placed back on propofol drip.  Patient started on thiamine and folic acid.  [facility] was called new read WBC actually 1 not 1000 gram stain negative.  Antibiotics and contact was d'cd.  Echo showed mild global hypokinesis EF 40 to 45 percent.  Patient extubated today 12/5 after weaning of propofol drip started precedex and seroquel 50mg BID and valium 10mg q 6h to assist with weaning.  Straight cathed x 2 for urinary retention.  Valium titrated down to 5  q 6 h and seroquel d'cd once extubated.    INTERVAL HPI/OVERNIGHT EVENTS:  Patient was seen and examined at bedside no acute events. Patient sedated cannot assess complaints.    VITAL SIGNS:  ICU Vital Signs Last 24 Hrs  T(C): 36.3 (05 Dec 2017 17:53), Max: 37.1 (05 Dec 2017 01:32)  T(F): 97.4 (05 Dec 2017 17:53), Max: 98.8 (05 Dec 2017 01:32)  HR: 58 (05 Dec 2017 18:00) (54 - 102)  BP: 117/75 (05 Dec 2017 18:00) (84/49 - 138/67)  BP(mean): 98 (05 Dec 2017 18:00) (63 - 118)  ABP: --  ABP(mean): --  RR: 12 (05 Dec 2017 18:00) (11 - 23)  SpO2: 96% (05 Dec 2017 18:00) (94% - 100%)      PHYSICAL EXAM:  AM EXAM  Constitutional: This AM sedated, NAD.  HEENT: Pupils 1mm but reactive to light, neck supple, no JVD, MMM, intubated.  Respiratory: CTA b/l, good air entry b/l, no wheezing, no rhonchi, no rales, without accessory muscle use and no intercostal retractions  Cardiovascular: RRR, normal S1S2, no M/R/G  Gastrointestinal: soft, NTND, no masses palpable, BS normal  Extremities: Warm, well perfused, pulses equal bilateral upper and lower extremities, no edema, no clubbing  Neurological: sedated.    Skin: Normal temperature, warm, dry    MEDICATIONS  (STANDING):  dextrose 5%. 1000 milliLiter(s) (50 mL/Hr) IV Continuous <Continuous>  dextrose 50% Injectable 12.5 Gram(s) IV Push once  dextrose 50% Injectable 25 Gram(s) IV Push once  dextrose 50% Injectable 25 Gram(s) IV Push once  diazepam    Tablet 5 milliGRAM(s) Oral every 6 hours  folic acid 1 milliGRAM(s) Enteral Tube daily  heparin  Injectable 5000 Unit(s) SubCutaneous every 8 hours  insulin lispro (HumaLOG) corrective regimen sliding scale   SubCutaneous every 6 hours  thiamine 100 milliGRAM(s) Enteral Tube daily    MEDICATIONS  (PRN):  dextrose Gel 1 Dose(s) Oral once PRN Blood Glucose LESS THAN 70 milliGRAM(s)/deciliter  glucagon  Injectable 1 milliGRAM(s) IntraMuscular once PRN Glucose LESS THAN 70 milligrams/deciliter        Allergies    No Known Allergies    Intolerances        LABS:                        12.7   17.3  )-----------( 242      ( 05 Dec 2017 06:55 )             38.6     12-05    140  |  103  |  18  ----------------------------<  139<H>  4.5   |  22  |  0.84    Ca    9.1      05 Dec 2017 06:55  Phos  2.7     12-04  Mg     2.3     12-05    TPro  6.8  /  Alb  3.7  /  TBili  0.3  /  DBili  x   /  AST  29  /  ALT  32  /  AlkPhos  48  12-04          RADIOLOGY & ADDITIONAL TESTS:  Reviewed

## 2017-12-05 NOTE — PROGRESS NOTE ADULT - ASSESSMENT
Patient is a 52 year old M transferred from [facility] in [place] with AMS, respiratory failure and severe sepsis likely due to bacterial meningitis with possible polysubstance overdose and seizure.    NEURO:  -Sedation with propofol    #meningitis  -Likely bacterial meningitis.  Fevers, AMS, LP with >1,000 WBC (neutrophil predominance), lactate 2.  -Preliminary LP results from [place] shows 1,000 WBC, 1,600 RBC.  Neutrophil predominance (60%).  Viral panel pending.    -c/w Rocephin, Vancomycin, Ampicillin.  Hold off on acyclovir.  -Decadron 1.5 mg/kg Q6H.    -f/u blood cx, RVP, final LP results    #AMS  -Likely toxic metabolic encephalopathy secondary to bacterial meningitis.  Possibly secondary to overdose vs seizure vs encephalitis  -Patient obtunded when EMS arrived.  Presented to [facility] agitated on a propofol gtt.  Opened eyes spontaneously and followed commands.  Moved all extremities spontaneously.  Strength 4+/5 throughout.  Eyes were midline and patient would not track, however he does not appear to have gaze deviance to the R.  Pupils 2 mm and reactive bilaterally.  Negative babinsky bilaterally.  -Utox (+) for cocaine, marijuana, opiates, EtOH.  S/p narcan by EMS  -Head CT and CTA show no abnormalities (per [facility] radiology attending)  -Follow up LP results.  Trx as above for meningitis   -f/u need for vEEG. C/w Keppra 500 mg IV Q12H.    -consider serotonin syndrome in the setting of polysubstance abuse.  Obtain collateral on outpatient med list.    -f/u B12, thiamine, RPR, TSH, HSV    #possible seizure  -Noted to have decorticate posturing and incontinence by EMS.  Possible sz secondary to meningitis vs polysubstance abuse  -No tongue lac.    -c/w Keppra  -c/w propofol gtt  -f/u need for vEEG    ID:  -CXR with possible mild bibasilar infiltrates (air bronchograms visible, more pronounced in LLL).  UA without evidence of UTI.    #severe sepsis secondary to meningitis  -treat as above.  follow up blood cx, RVP, HIV, repeat lactate    CARDIAC:  #sinus tachycardia  -Resolving.  Presented to [facility] with HR 110s-120s.  Secondary to severe sepsis, possibly cocaine.  Avoid beta blockers in the setting of cocaine use.   -EKG:  sinus tachycardia  -Trop negative x 1 at [place]    RESP:  #respiratory failure  -Secondary to AMS.  Intubated for airway protection.  -On ventilator.  -ABG showed elevated pO2.  Titrated down FiO2.    -CXR with possible mild bibasilar infiltrates (more pronounced in LLL)  -f/u RVP    GI:  NPO  Protonix 40 mg daily  No bowel regimen at this time  (+) OGT    RENAL:  Creatinine WNL.   Trend BMP  Monitor UOP.  (+) Shah    HEME:  -Hgb and plt WNL. Trend CBC  -WBC 15K  -Hep SQ    ENDO:  -No known history of thyroidal dz or DM.    -Follow fingersticks  -f/u TSH    PSYCH:  #polysubstance abuse  -Utox positive for cocaine, marijuana, opiates.  Monitor for signs of withdrawal.  Once extubated, monitor CIWA     F:  NS @ 100/hour  E:  Replete PRN to keep Mg>2, K>4  N:  NPO for now  Ppx: SCDs, protonix  Code status:  full code  Dispo:  MICU Patient is a 52 year old M transferred from [facility] in [place] with AMS, respiratory failure 2/2 to polysubstance use.   NEURO:  - Sedated this AM with propofol, wean off propofol drip with Seroquel and  precedex and valium.  - Patient extubated this PM on rounds.  Valium titrated down to 5 q 6 h and seroquel d'cd once extubated.     #AMS  -Likely secondary to polysubstance use overdose  -Utox (+) for cocaine, marijuana, opiates, EtOH.  S/p narcan by EMS  -Head CT and CTA show no abnormalities (per [facility] radiology attending)  - D'cd keppra today  - LP negative r/o meningitis  - c/w valium now extubated     ID:  - no infection    CARDIAC:  #sinus tachycardia  -Resolved, possibly 2/2 to cocaine use  -EKG:  sinus tachycardia  -Trop negative x 1 at [place]    RESP:  #respiratory failure  -Secondary to AMS.  Intubated for airway protection.  -On ventilator this AM extubated this PM with no complciations    GI:  NPO until passed speech and swallow evaluation  No bowel regimen at this time    RENAL:  Creatinine WNL.   Trend BMP  Monitor UOP s/p straight cath x 2     HEME:  -Hgb and plt WNL. Trend CBC  -WBC 17.3K  -Hep SQ    ENDO:  -No known history of thyroidal dz or DM.        PSYCH:  #polysubstance abuse  -Utox positive for cocaine, marijuana, opiates.  Monitor for signs of withdrawal.  Once extubated, monitor CIWA     F: D5 50 cc/hr  E:  Replete PRN to keep Mg>2, K>4  N:  NPO for now  Ppx: hep sub q   Code status:  full code  Dispo:  MICU Patient is a 52 year old M transferred from [facility] in [place] with AMS, respiratory failure 2/2 to polysubstance use and toxic metabolic encephalopathy.   NEURO:  - Sedated this AM with propofol, wean off propofol drip with Seroquel and  precedex and valium.  - Patient extubated this PM on rounds.  Valium titrated down to 5 q 6 h and seroquel d'cd once extubated.     #AMS  -Likely secondary to polysubstance use overdose  -Utox (+) for cocaine, marijuana, opiates, EtOH.  S/p narcan by EMS  -Head CT and CTA show no abnormalities (per [facility] radiology attending)  - D'cd keppra today  - LP negative r/o meningitis  - c/w valium now extubated     ID:  - no infection    CARDIAC:  #sinus tachycardia  -Resolved, possibly 2/2 to cocaine use  -EKG:  sinus tachycardia  -Trop negative x 1 at [place]    RESP:  #respiratory failure  -Secondary to AMS.  Intubated for airway protection.  -On ventilator this AM extubated this PM with no complciations    GI:  NPO until passed speech and swallow evaluation  No bowel regimen at this time    RENAL:  Creatinine WNL.   Trend BMP  Monitor UOP s/p straight cath x 2     HEME:  -Hgb and plt WNL. Trend CBC  -WBC 17.3K  -Hep SQ    ENDO:  -No known history of thyroidal dz or DM.        PSYCH:  #polysubstance abuse  -Utox positive for cocaine, marijuana, opiates.  Monitor for signs of withdrawal.  Once extubated, monitor CIWA     F: D5 50 cc/hr  E:  Replete PRN to keep Mg>2, K>4  N:  NPO for now  Ppx: hep sub q   Code status:  full code  Dispo:  MICU

## 2017-12-05 NOTE — DIETITIAN INITIAL EVALUATION ADULT. - NS AS NUTRI INTERV ENTERAL NUTRITION
Jevity 1.2 at 50 mL x 24 hr route per MD to provide 1200 mL TV, 1440 kcal, 67 g protein, & 968 mL fluid

## 2017-12-05 NOTE — DIETITIAN INITIAL EVALUATION ADULT. - OTHER INFO
Patient is a 52 year old M transferred from [facility] in [place] with AMS, respiratory failure and severe sepsis likely due to bacterial meningitis with possible polysubstance overdose and seizure. Intubated at time of visit, per RN pt for possible extubation. Ht obtained with RN - 5'4''. MAP 93.

## 2017-12-06 VITALS — DIASTOLIC BLOOD PRESSURE: 77 MMHG | RESPIRATION RATE: 14 BRPM | SYSTOLIC BLOOD PRESSURE: 127 MMHG | HEART RATE: 66 BPM

## 2017-12-06 LAB
ANION GAP SERPL CALC-SCNC: 12 MMOL/L — SIGNIFICANT CHANGE UP (ref 5–17)
BUN SERPL-MCNC: 23 MG/DL — SIGNIFICANT CHANGE UP (ref 7–23)
CALCIUM SERPL-MCNC: 8.9 MG/DL — SIGNIFICANT CHANGE UP (ref 8.4–10.5)
CHLORIDE SERPL-SCNC: 106 MMOL/L — SIGNIFICANT CHANGE UP (ref 96–108)
CO2 SERPL-SCNC: 24 MMOL/L — SIGNIFICANT CHANGE UP (ref 22–31)
CREAT SERPL-MCNC: 0.86 MG/DL — SIGNIFICANT CHANGE UP (ref 0.5–1.3)
GLUCOSE BLDC GLUCOMTR-MCNC: 98 MG/DL — SIGNIFICANT CHANGE UP (ref 70–99)
GLUCOSE SERPL-MCNC: 99 MG/DL — SIGNIFICANT CHANGE UP (ref 70–99)
HCT VFR BLD CALC: 36.1 % — LOW (ref 39–50)
HGB BLD-MCNC: 11.7 G/DL — LOW (ref 13–17)
MAGNESIUM SERPL-MCNC: 2.2 MG/DL — SIGNIFICANT CHANGE UP (ref 1.6–2.6)
MCHC RBC-ENTMCNC: 28.1 PG — SIGNIFICANT CHANGE UP (ref 27–34)
MCHC RBC-ENTMCNC: 32.4 G/DL — SIGNIFICANT CHANGE UP (ref 32–36)
MCV RBC AUTO: 86.6 FL — SIGNIFICANT CHANGE UP (ref 80–100)
PLATELET # BLD AUTO: 193 K/UL — SIGNIFICANT CHANGE UP (ref 150–400)
POTASSIUM SERPL-MCNC: 3.9 MMOL/L — SIGNIFICANT CHANGE UP (ref 3.5–5.3)
POTASSIUM SERPL-SCNC: 3.9 MMOL/L — SIGNIFICANT CHANGE UP (ref 3.5–5.3)
RBC # BLD: 4.17 M/UL — LOW (ref 4.2–5.8)
RBC # FLD: 13.6 % — SIGNIFICANT CHANGE UP (ref 10.3–16.9)
SODIUM SERPL-SCNC: 142 MMOL/L — SIGNIFICANT CHANGE UP (ref 135–145)
WBC # BLD: 12.2 K/UL — HIGH (ref 3.8–10.5)
WBC # FLD AUTO: 12.2 K/UL — HIGH (ref 3.8–10.5)

## 2017-12-06 PROCEDURE — 87040 BLOOD CULTURE FOR BACTERIA: CPT

## 2017-12-06 PROCEDURE — 87486 CHLMYD PNEUM DNA AMP PROBE: CPT

## 2017-12-06 PROCEDURE — 82607 VITAMIN B-12: CPT

## 2017-12-06 PROCEDURE — 71045 X-RAY EXAM CHEST 1 VIEW: CPT

## 2017-12-06 PROCEDURE — 85025 COMPLETE CBC W/AUTO DIFF WBC: CPT

## 2017-12-06 PROCEDURE — 80053 COMPREHEN METABOLIC PANEL: CPT

## 2017-12-06 PROCEDURE — 84425 ASSAY OF VITAMIN B-1: CPT

## 2017-12-06 PROCEDURE — 84100 ASSAY OF PHOSPHORUS: CPT

## 2017-12-06 PROCEDURE — 83690 ASSAY OF LIPASE: CPT

## 2017-12-06 PROCEDURE — 82803 BLOOD GASES ANY COMBINATION: CPT

## 2017-12-06 PROCEDURE — 85027 COMPLETE CBC AUTOMATED: CPT

## 2017-12-06 PROCEDURE — 87633 RESP VIRUS 12-25 TARGETS: CPT

## 2017-12-06 PROCEDURE — 87581 M.PNEUMON DNA AMP PROBE: CPT

## 2017-12-06 PROCEDURE — 82962 GLUCOSE BLOOD TEST: CPT

## 2017-12-06 PROCEDURE — 87389 HIV-1 AG W/HIV-1&-2 AB AG IA: CPT

## 2017-12-06 PROCEDURE — 95951: CPT

## 2017-12-06 PROCEDURE — 86780 TREPONEMA PALLIDUM: CPT

## 2017-12-06 PROCEDURE — 93306 TTE W/DOPPLER COMPLETE: CPT

## 2017-12-06 PROCEDURE — 80048 BASIC METABOLIC PNL TOTAL CA: CPT

## 2017-12-06 PROCEDURE — 94002 VENT MGMT INPAT INIT DAY: CPT

## 2017-12-06 PROCEDURE — 83605 ASSAY OF LACTIC ACID: CPT

## 2017-12-06 PROCEDURE — 87798 DETECT AGENT NOS DNA AMP: CPT

## 2017-12-06 PROCEDURE — 94003 VENT MGMT INPAT SUBQ DAY: CPT

## 2017-12-06 PROCEDURE — 83735 ASSAY OF MAGNESIUM: CPT

## 2017-12-06 RX ADMIN — HEPARIN SODIUM 5000 UNIT(S): 5000 INJECTION INTRAVENOUS; SUBCUTANEOUS at 06:00

## 2017-12-06 RX ADMIN — Medication 5 MILLIGRAM(S): at 05:57

## 2017-12-06 NOTE — DISCHARGE NOTE ADULT - PATIENT PORTAL LINK FT
“You can access the Follow[facility] Patient Portal, offered by [facility], by registering with the following website: [email or web address]”

## 2017-12-06 NOTE — DISCHARGE NOTE ADULT - HOSPITAL COURSE
Pt is a 52 year old man transferred from [facility] in [place] was admitted for AMS found unresponsive on floor of a deli shop.  Hx significant of illicit drug use as per family and alcohol use as per friend. EMS was called, patient had agonal respirations observed by EMT.  Patient was put on a nonrebreather.  EMS gave Narcan 2mg intranasally because of pinpoint pupils.  Patient remained unresponsive.  Noted to have a decorticate posture received 5mg Versed.  In the ED patient afebrile, tachycardic, with elevated blood pressure 164/110 satting well on nonrebreather.  Patient was given 8mg of Ativan followed by Ketamine.  He was intubated and started on propofol drip.  Blood gas was pH 7.33, pCO2 41, pO2 107.  Bmp bicarb 19, anion gap 25.  Utox positive for cocaine, opiates and marijuana, ETOH level 76mg/dl.  Lactate 2. CPK WNL. LP was performed read initially as 1000 WBC, CT head negative.  Prior to transfer he spiked to 103.  Patient received 2 grams of Rocephin, Keppra 1g and was transferred for EEG monitoring and for MRI [place] with no vented patient MRI capabilities). RVP came back positive for coronavirus.  Patient on arrival to [facility] was unable to extubated 2/2 agitation.  Placed back on propofol drip.  Patient started on thiamine and folic acid.  [facility] was called new read WBC actually 1 not 1000 gram stain negative.  Antibiotics and contact was d'cd.  Echo showed mild global hypokinesis EF 40 to 45 percent.  Patient extubated today 12/5 after weaning of propofol drip started precedex and seroquel 50mg BID and valium 10mg q 6h to assist with weaning.  Straight cathed x 2 for urinary retention.  Valium titrated down to 5  q 6 h and seroquel d'cd once extubated. Pt is a 52 year old man transferred from [facility] in [place] was admitted for AMS found unresponsive on floor of a deli shop.  Hx significant of illicit drug use as per family and alcohol use as per friend. EMS was called, patient had agonal respirations observed by EMT.  Patient was put on a nonrebreather.  EMS gave Narcan 2mg intranasally because of pinpoint pupils.  Patient remained unresponsive.  Noted to have a decorticate posture received 5mg Versed.  In the ED patient afebrile, tachycardic, with elevated blood pressure 164/110 satting well on nonrebreather.  Patient was given 8mg of Ativan followed by Ketamine.  He was intubated and started on propofol drip.  Blood gas was pH 7.33, pCO2 41, pO2 107.  Bmp bicarb 19, anion gap 25.  Utox positive for cocaine, opiates and marijuana, ETOH level 76mg/dl.  Lactate 2. CPK WNL. LP was performed read initially as 1000 WBC, CT head negative.  Prior to transfer he spiked to 103.  Patient received 2 grams of Rocephin, Keppra 1g and was transferred for EEG monitoring and for MRI [place] with no vented patient MRI capabilities). RVP came back positive for coronavirus.  Patient on arrival to [facility] was unable to extubated 2/2 agitation.  Placed back on propofol drip.  Patient started on thiamine and folic acid.  [facility] was called new read WBC actually 1 not 1000 gram stain negative.  Antibiotics and contact was d'cd.  Echo showed mild global hypokinesis EF 40 to 45 percent.  Patient extubated today 12/5 after weaning of propofol drip started precedex and seroquel 50mg BID and valium 10mg q 6h to assist with weaning.  Straight cathed x 2 for urinary retention.  Valium titrated down to 5  q 6 h and seroquel d'cd once extubated. Patient started on regular diet.

## 2017-12-06 NOTE — DISCHARGE NOTE ADULT - PLAN OF CARE
Resolution of altered mental status You came in altered 2/2 to drug use.  You were treated for your respiratory distress and was intubated during this admission.  Follow up with your PCP for long term management of drug misuse. return to baseline respiratory status You were treated for respiratory distress 2/2 to drug use.  You were intubated and successfully extubated.

## 2017-12-06 NOTE — DISCHARGE NOTE ADULT - CARE PLAN
Principal Discharge DX:	Polysubstance abuse  Goal:	Resolution of altered mental status  Instructions for follow-up, activity and diet:	You came in altered 2/2 to drug use.  You were treated for your respiratory distress and was intubated during this admission.  Follow up with your PCP for long term management of drug misuse.  Secondary Diagnosis:	Respiratory failure requiring intubation  Goal:	return to baseline respiratory status  Instructions for follow-up, activity and diet:	You were treated for respiratory distress 2/2 to drug use.  You were intubated and successfully extubated.

## 2017-12-08 LAB — VIT B1 SERPL-MCNC: 135.6 NMOL/L — SIGNIFICANT CHANGE UP (ref 66.5–200)

## 2017-12-09 LAB
CULTURE RESULTS: SIGNIFICANT CHANGE UP
CULTURE RESULTS: SIGNIFICANT CHANGE UP
SPECIMEN SOURCE: SIGNIFICANT CHANGE UP
SPECIMEN SOURCE: SIGNIFICANT CHANGE UP

## 2017-12-13 DIAGNOSIS — R32 UNSPECIFIED URINARY INCONTINENCE: ICD-10-CM

## 2017-12-13 DIAGNOSIS — J96.90 RESPIRATORY FAILURE, UNSPECIFIED, UNSPECIFIED WHETHER WITH HYPOXIA OR HYPERCAPNIA: ICD-10-CM

## 2017-12-13 DIAGNOSIS — J18.9 PNEUMONIA, UNSPECIFIED ORGANISM: ICD-10-CM

## 2017-12-13 DIAGNOSIS — R45.1 RESTLESSNESS AND AGITATION: ICD-10-CM

## 2017-12-13 DIAGNOSIS — G92 TOXIC ENCEPHALOPATHY: ICD-10-CM

## 2017-12-13 DIAGNOSIS — T40.2X1A POISONING BY OTHER OPIOIDS, ACCIDENTAL (UNINTENTIONAL), INITIAL ENCOUNTER: ICD-10-CM

## 2017-12-13 DIAGNOSIS — F11.129 OPIOID ABUSE WITH INTOXICATION, UNSPECIFIED: ICD-10-CM

## 2018-02-07 NOTE — H&P ADULT - ATTENDING COMMENTS
L FORD (conv posterior) pending for 2/16/18 polysubstance abuse and likely opiate intoxication  acute respiratory failure due to obtundation  LLL infiltrate aspiration pna vs CAP  bacterial meningitis
